# Patient Record
Sex: MALE | Race: WHITE | Employment: UNEMPLOYED | URBAN - METROPOLITAN AREA
[De-identification: names, ages, dates, MRNs, and addresses within clinical notes are randomized per-mention and may not be internally consistent; named-entity substitution may affect disease eponyms.]

---

## 2019-02-15 ENCOUNTER — OFFICE VISIT (OUTPATIENT)
Dept: FAMILY MEDICINE CLINIC | Age: 24
End: 2019-02-15
Payer: COMMERCIAL

## 2019-02-15 VITALS
WEIGHT: 114 LBS | SYSTOLIC BLOOD PRESSURE: 98 MMHG | DIASTOLIC BLOOD PRESSURE: 70 MMHG | BODY MASS INDEX: 18.99 KG/M2 | HEIGHT: 65 IN

## 2019-02-15 DIAGNOSIS — F84.0 AUTISM SPECTRUM DISORDER WITH ACCOMPANYING INTELLECTUAL IMPAIRMENT, REQUIRING SUBTANTIAL SUPPORT (LEVEL 2): ICD-10-CM

## 2019-02-15 DIAGNOSIS — Z23 NEED FOR TDAP VACCINATION: ICD-10-CM

## 2019-02-15 DIAGNOSIS — Q90.9 DOWN'S SYNDROME: ICD-10-CM

## 2019-02-15 DIAGNOSIS — Z00.00 PREVENTATIVE HEALTH CARE: Primary | ICD-10-CM

## 2019-02-15 PROCEDURE — 90715 TDAP VACCINE 7 YRS/> IM: CPT | Performed by: FAMILY MEDICINE

## 2019-02-15 PROCEDURE — 90471 IMMUNIZATION ADMIN: CPT | Performed by: FAMILY MEDICINE

## 2019-02-15 PROCEDURE — 99385 PREV VISIT NEW AGE 18-39: CPT | Performed by: FAMILY MEDICINE

## 2019-02-15 RX ORDER — HYDROXYZINE HYDROCHLORIDE 25 MG/1
25 TABLET, FILM COATED ORAL 3 TIMES DAILY PRN
COMMUNITY
End: 2019-12-09

## 2019-02-15 ASSESSMENT — PATIENT HEALTH QUESTIONNAIRE - PHQ9
DEPRESSION UNABLE TO ASSESS: FUNCTIONAL CAPACITY MOTIVATION LIMITS ACCURACY
SUM OF ALL RESPONSES TO PHQ QUESTIONS 1-9: 0
SUM OF ALL RESPONSES TO PHQ QUESTIONS 1-9: 0
2. FEELING DOWN, DEPRESSED OR HOPELESS: 0
1. LITTLE INTEREST OR PLEASURE IN DOING THINGS: 0
SUM OF ALL RESPONSES TO PHQ9 QUESTIONS 1 & 2: 0

## 2019-02-25 ASSESSMENT — ENCOUNTER SYMPTOMS
SHORTNESS OF BREATH: 0
ABDOMINAL PAIN: 0
BLOOD IN STOOL: 0
ROS SKIN COMMENTS: NO SKIN WOUNDS.

## 2019-03-01 ENCOUNTER — HOSPITAL ENCOUNTER (OUTPATIENT)
Dept: GENERAL RADIOLOGY | Age: 24
Discharge: HOME | End: 2019-03-01
Payer: COMMERCIAL

## 2019-03-01 ENCOUNTER — OFFICE VISIT (OUTPATIENT)
Dept: FAMILY MEDICINE CLINIC | Age: 24
End: 2019-03-01
Payer: COMMERCIAL

## 2019-03-01 ENCOUNTER — HOSPITAL ENCOUNTER (OUTPATIENT)
Age: 24
Discharge: HOME | End: 2019-03-01
Payer: COMMERCIAL

## 2019-03-01 VITALS
TEMPERATURE: 97.2 F | DIASTOLIC BLOOD PRESSURE: 74 MMHG | WEIGHT: 112.2 LBS | SYSTOLIC BLOOD PRESSURE: 112 MMHG | HEIGHT: 65 IN | BODY MASS INDEX: 18.69 KG/M2

## 2019-03-01 DIAGNOSIS — R05.9 COUGH: ICD-10-CM

## 2019-03-01 DIAGNOSIS — R50.9 FEVER, UNSPECIFIED FEVER CAUSE: Primary | ICD-10-CM

## 2019-03-01 DIAGNOSIS — R50.9 FEVER, UNSPECIFIED FEVER CAUSE: ICD-10-CM

## 2019-03-01 LAB
INFLUENZA A ANTIGEN, POC: NEGATIVE
INFLUENZA B ANTIGEN, POC: NEGATIVE

## 2019-03-01 PROCEDURE — 99213 OFFICE O/P EST LOW 20 MIN: CPT | Performed by: FAMILY MEDICINE

## 2019-03-01 PROCEDURE — 71046 X-RAY EXAM CHEST 2 VIEWS: CPT

## 2019-03-01 PROCEDURE — 87804 INFLUENZA ASSAY W/OPTIC: CPT | Performed by: FAMILY MEDICINE

## 2019-07-26 ENCOUNTER — OFFICE VISIT (OUTPATIENT)
Dept: FAMILY MEDICINE CLINIC | Age: 24
End: 2019-07-26
Payer: COMMERCIAL

## 2019-07-26 VITALS
BODY MASS INDEX: 18.99 KG/M2 | SYSTOLIC BLOOD PRESSURE: 118 MMHG | HEIGHT: 65 IN | WEIGHT: 114 LBS | DIASTOLIC BLOOD PRESSURE: 80 MMHG

## 2019-07-26 DIAGNOSIS — Q90.9 DOWN'S SYNDROME: ICD-10-CM

## 2019-07-26 DIAGNOSIS — H92.13 OTORRHEA OF BOTH EARS: Primary | ICD-10-CM

## 2019-07-26 PROCEDURE — 99213 OFFICE O/P EST LOW 20 MIN: CPT | Performed by: FAMILY MEDICINE

## 2019-07-26 RX ORDER — CIPROFLOXACIN HYDROCHLORIDE 3.5 MG/ML
SOLUTION/ DROPS TOPICAL
COMMUNITY
Start: 2019-07-20 | End: 2019-09-05 | Stop reason: ALTCHOICE

## 2019-07-26 RX ORDER — AMOXICILLIN 400 MG/5ML
POWDER, FOR SUSPENSION ORAL
COMMUNITY
Start: 2019-07-20 | End: 2019-07-26 | Stop reason: ALTCHOICE

## 2019-07-26 RX ORDER — AZITHROMYCIN 200 MG/5ML
POWDER, FOR SUSPENSION ORAL
Qty: 36 ML | Refills: 0
Start: 2019-07-26 | End: 2019-09-05 | Stop reason: ALTCHOICE

## 2019-09-05 ENCOUNTER — OFFICE VISIT (OUTPATIENT)
Dept: FAMILY MEDICINE CLINIC | Age: 24
End: 2019-09-05
Payer: COMMERCIAL

## 2019-09-05 VITALS
WEIGHT: 109.4 LBS | HEIGHT: 65 IN | BODY MASS INDEX: 18.23 KG/M2 | DIASTOLIC BLOOD PRESSURE: 60 MMHG | SYSTOLIC BLOOD PRESSURE: 108 MMHG | TEMPERATURE: 97.4 F

## 2019-09-05 DIAGNOSIS — R23.1 CLAMMY SKIN: ICD-10-CM

## 2019-09-05 DIAGNOSIS — J02.9 SORE THROAT: ICD-10-CM

## 2019-09-05 DIAGNOSIS — R50.9 FEVER, UNSPECIFIED FEVER CAUSE: Primary | ICD-10-CM

## 2019-09-05 DIAGNOSIS — F84.0 AUTISM SPECTRUM DISORDER WITH ACCOMPANYING INTELLECTUAL IMPAIRMENT, REQUIRING SUBTANTIAL SUPPORT (LEVEL 2): ICD-10-CM

## 2019-09-05 DIAGNOSIS — Q90.9 DOWN'S SYNDROME: ICD-10-CM

## 2019-09-05 PROCEDURE — 99213 OFFICE O/P EST LOW 20 MIN: CPT | Performed by: FAMILY MEDICINE

## 2019-09-05 RX ORDER — AZITHROMYCIN 200 MG/5ML
POWDER, FOR SUSPENSION ORAL
Qty: 15 ML | Refills: 0 | Status: SHIPPED | OUTPATIENT
Start: 2019-09-05 | End: 2019-12-09

## 2019-09-05 ASSESSMENT — ENCOUNTER SYMPTOMS
ALLERGIC/IMMUNOLOGIC NEGATIVE: 1
EYES NEGATIVE: 1
TROUBLE SWALLOWING: 1
CHOKING: 1
GASTROINTESTINAL NEGATIVE: 1
SORE THROAT: 1
VOICE CHANGE: 1

## 2019-12-09 ENCOUNTER — OFFICE VISIT (OUTPATIENT)
Dept: FAMILY MEDICINE CLINIC | Age: 24
End: 2019-12-09
Payer: COMMERCIAL

## 2019-12-09 ENCOUNTER — TELEPHONE (OUTPATIENT)
Dept: FAMILY MEDICINE CLINIC | Age: 24
End: 2019-12-09

## 2019-12-09 VITALS
DIASTOLIC BLOOD PRESSURE: 76 MMHG | HEIGHT: 65 IN | BODY MASS INDEX: 19.16 KG/M2 | SYSTOLIC BLOOD PRESSURE: 114 MMHG | WEIGHT: 115 LBS | TEMPERATURE: 97.9 F

## 2019-12-09 DIAGNOSIS — B96.89 ACUTE BACTERIAL SINUSITIS: Primary | ICD-10-CM

## 2019-12-09 DIAGNOSIS — J01.90 ACUTE BACTERIAL SINUSITIS: Primary | ICD-10-CM

## 2019-12-09 DIAGNOSIS — Z00.00 PREVENTATIVE HEALTH CARE: Primary | ICD-10-CM

## 2019-12-09 PROCEDURE — 99213 OFFICE O/P EST LOW 20 MIN: CPT | Performed by: INTERNAL MEDICINE

## 2019-12-09 RX ORDER — AZITHROMYCIN 250 MG/1
250 TABLET, FILM COATED ORAL SEE ADMIN INSTRUCTIONS
Qty: 6 TABLET | Refills: 0 | Status: SHIPPED | OUTPATIENT
Start: 2019-12-09 | End: 2019-12-14

## 2019-12-09 RX ORDER — AZITHROMYCIN 250 MG/1
250 TABLET, FILM COATED ORAL SEE ADMIN INSTRUCTIONS
Qty: 6 TABLET | Refills: 0 | Status: SHIPPED | OUTPATIENT
Start: 2019-12-09 | End: 2019-12-09 | Stop reason: SDUPTHER

## 2019-12-09 ASSESSMENT — ENCOUNTER SYMPTOMS
ABDOMINAL PAIN: 0
SHORTNESS OF BREATH: 0
SINUS PRESSURE: 1
APNEA: 0
COUGH: 0
RHINORRHEA: 1

## 2019-12-17 DIAGNOSIS — Z00.00 PREVENTATIVE HEALTH CARE: ICD-10-CM

## 2019-12-17 LAB
A/G RATIO: 1.4 (ref 1.1–2.2)
ALBUMIN SERPL-MCNC: 4.4 G/DL (ref 3.4–5)
ALP BLD-CCNC: 111 U/L (ref 40–129)
ALT SERPL-CCNC: 9 U/L (ref 10–40)
ANION GAP SERPL CALCULATED.3IONS-SCNC: 19 MMOL/L (ref 3–16)
AST SERPL-CCNC: 23 U/L (ref 15–37)
BASOPHILS ABSOLUTE: 0.1 K/UL (ref 0–0.2)
BASOPHILS RELATIVE PERCENT: 1.3 %
BILIRUB SERPL-MCNC: 2 MG/DL (ref 0–1)
BUN BLDV-MCNC: 10 MG/DL (ref 7–20)
CALCIUM SERPL-MCNC: 9.5 MG/DL (ref 8.3–10.6)
CHLORIDE BLD-SCNC: 97 MMOL/L (ref 99–110)
CHOLESTEROL, TOTAL: 114 MG/DL (ref 0–199)
CO2: 24 MMOL/L (ref 21–32)
CREAT SERPL-MCNC: 0.8 MG/DL (ref 0.9–1.3)
EOSINOPHILS ABSOLUTE: 0.1 K/UL (ref 0–0.6)
EOSINOPHILS RELATIVE PERCENT: 3.2 %
GFR AFRICAN AMERICAN: >60
GFR NON-AFRICAN AMERICAN: >60
GLOBULIN: 3.2 G/DL
GLUCOSE BLD-MCNC: 89 MG/DL (ref 70–99)
HCT VFR BLD CALC: 50.6 % (ref 40.5–52.5)
HDLC SERPL-MCNC: 28 MG/DL (ref 40–60)
HEMOGLOBIN: 16.7 G/DL (ref 13.5–17.5)
LDL CHOLESTEROL CALCULATED: 70 MG/DL
LYMPHOCYTES ABSOLUTE: 1 K/UL (ref 1–5.1)
LYMPHOCYTES RELATIVE PERCENT: 21.7 %
MCH RBC QN AUTO: 34.5 PG (ref 26–34)
MCHC RBC AUTO-ENTMCNC: 33 G/DL (ref 31–36)
MCV RBC AUTO: 104.4 FL (ref 80–100)
MONOCYTES ABSOLUTE: 0.8 K/UL (ref 0–1.3)
MONOCYTES RELATIVE PERCENT: 17.4 %
NEUTROPHILS ABSOLUTE: 2.6 K/UL (ref 1.7–7.7)
NEUTROPHILS RELATIVE PERCENT: 56.4 %
PDW BLD-RTO: 13.7 % (ref 12.4–15.4)
PLATELET # BLD: 190 K/UL (ref 135–450)
PMV BLD AUTO: 10.4 FL (ref 5–10.5)
POTASSIUM SERPL-SCNC: 4.3 MMOL/L (ref 3.5–5.1)
RBC # BLD: 4.85 M/UL (ref 4.2–5.9)
SODIUM BLD-SCNC: 140 MMOL/L (ref 136–145)
T4 FREE: 1.1 NG/DL (ref 0.9–1.8)
TOTAL PROTEIN: 7.6 G/DL (ref 6.4–8.2)
TRIGL SERPL-MCNC: 78 MG/DL (ref 0–150)
TSH REFLEX: 5.46 UIU/ML (ref 0.27–4.2)
VITAMIN D 25-HYDROXY: 29.6 NG/ML
VLDLC SERPL CALC-MCNC: 16 MG/DL
WBC # BLD: 4.6 K/UL (ref 4–11)

## 2019-12-19 ENCOUNTER — OFFICE VISIT (OUTPATIENT)
Dept: FAMILY MEDICINE CLINIC | Age: 24
End: 2019-12-19
Payer: COMMERCIAL

## 2019-12-19 VITALS
HEIGHT: 64 IN | BODY MASS INDEX: 19.53 KG/M2 | SYSTOLIC BLOOD PRESSURE: 110 MMHG | WEIGHT: 114.4 LBS | DIASTOLIC BLOOD PRESSURE: 70 MMHG

## 2019-12-19 PROCEDURE — 90471 IMMUNIZATION ADMIN: CPT | Performed by: FAMILY MEDICINE

## 2019-12-19 PROCEDURE — 99395 PREV VISIT EST AGE 18-39: CPT | Performed by: FAMILY MEDICINE

## 2019-12-19 PROCEDURE — 90686 IIV4 VACC NO PRSV 0.5 ML IM: CPT | Performed by: FAMILY MEDICINE

## 2019-12-19 RX ORDER — TRIAMCINOLONE ACETONIDE 1 MG/G
CREAM TOPICAL
Qty: 80 G | Refills: 0 | Status: SHIPPED | OUTPATIENT
Start: 2019-12-19 | End: 2020-03-16 | Stop reason: ALTCHOICE

## 2019-12-19 NOTE — PROGRESS NOTES
Vaccine Information Sheet, \"Influenza - Inactivated\"  given to Juanjose Meyers, or parent/legal guardian of  Juanjose Meyers and verbalized understanding. Patient responses:    Have you ever had a reaction to a flu vaccine? No  Do you have any current illness? No  Have you ever had Guillian Slidell Syndrome? No  Do you have a serious allergy to any of the follow: Neomycin, Polymyxin, Thimerosal, eggs or egg products? No    Flu vaccine given per order. Please see immunization tab. Risks and benefits explained. Current VIS given.       Immunizations Administered     Name Date Dose Route    Influenza, Quadv, IM, PF (6 mo and older Fluzone, Flulaval, Fluarix, and 3 yrs and older Afluria) 12/19/2019 0.5 mL Intramuscular    Site: Deltoid- Left    Lot: [id number]    [address]: 80870-440-14

## 2019-12-19 NOTE — PROGRESS NOTES
12/19/2019    Cipriano Jo is a 27 y.o. male    Chief Complaint   Patient presents with    Annual Exam     cpe        SUBJECTIVE  HPI Cipriano Jo is a 27 y.o. male presenting today with his mother,with a chief complaint of needing a yearly physical exam.  Fasting labs done December 17, 2019. Mom would like me to look at a rash in the eyebrows. Fasting labs done. Review of Systems   Constitutional: Negative for chills and fever. HENT: Negative for ear pain, mouth sores and nosebleeds. Eyes: Negative for pain. Respiratory: Negative for shortness of breath and wheezing. Cardiovascular: Negative for chest pain and leg swelling. Gastrointestinal: Positive for abdominal pain. Negative for blood in stool, constipation, diarrhea and vomiting. Genitourinary: Negative for hematuria. Musculoskeletal: Negative for joint swelling. Skin: Negative for rash. Neurological: Negative for tremors and syncope. Hematological: Negative for adenopathy. Does not bruise/bleed easily. Past Medical History:   Diagnosis Date    Autism     Autism     Celiac disease     Down syndrome     Down syndrome     Heart murmur      Past Surgical History:   Procedure Laterality Date    CARDIAC SURGERY      as a baby    CARDIAC SURGERY      ESOPHAGEAL DILATATION      TYMPANOSTOMY TUBE PLACEMENT       Family History   Problem Relation Age of Onset    Diabetes Paternal Grandmother     Cancer Paternal Grandmother         Breast and Kidney    High Blood Pressure Paternal Grandmother     High Blood Pressure Paternal Grandfather      Social History     Tobacco Use   Smoking Status Never Smoker   Smokeless Tobacco Never Used      Patient Active Problem List   Diagnosis    Autism spectrum disorder with accompanying intellectual impairment, requiring subtantial support (level 2)    Down's syndrome       OBJECTIVE  Physical Exam  Constitutional:       General: He is not in acute distress.      Appearance: (Prevnar7) 06/14/2000    Polio IPV (IPOL) 1997, 1997, 12/18/1998, 07/16/2001    Tdap (Boostrix, Adacel) 07/14/2008, 06/22/2009, 02/15/2019    Varicella (Varivax) 08/13/1998, 07/14/2008     Lab Review   Orders Only on 12/17/2019   Component Date Value    Sodium 12/17/2019 140     Potassium 12/17/2019 4.3     Chloride 12/17/2019 97*    CO2 12/17/2019 24     Anion Gap 12/17/2019 19*    Glucose 12/17/2019 89     BUN 12/17/2019 10     CREATININE 12/17/2019 0.8*    GFR Non- 12/17/2019 >60     GFR  12/17/2019 >60     Calcium 12/17/2019 9.5     Total Protein 12/17/2019 7.6     Alb 12/17/2019 4.4     Albumin/Globulin Ratio 12/17/2019 1.4     Total Bilirubin 12/17/2019 2.0*    Alkaline Phosphatase 12/17/2019 111     ALT 12/17/2019 9*    AST 12/17/2019 23     Globulin 12/17/2019 3.2     TSH 12/17/2019 5.46*    Vit D, 25-Hydroxy 12/17/2019 29.6*    Cholesterol, Total 12/17/2019 114     Triglycerides 12/17/2019 78     HDL 12/17/2019 28*    LDL Calculated 12/17/2019 70     VLDL Cholesterol Calcula* 12/17/2019 16     WBC 12/17/2019 4.6     RBC 12/17/2019 4.85     Hemoglobin 12/17/2019 16.7     Hematocrit 12/17/2019 50.6     MCV 12/17/2019 104.4*    MCH 12/17/2019 34.5*    MCHC 12/17/2019 33.0     RDW 12/17/2019 13.7     Platelets 18/35/1321 190     MPV 12/17/2019 10.4     Neutrophils % 12/17/2019 56.4     Lymphocytes % 12/17/2019 21.7     Monocytes % 12/17/2019 17.4     Eosinophils % 12/17/2019 3.2     Basophils % 12/17/2019 1.3     Neutrophils Absolute 12/17/2019 2.6     Lymphocytes Absolute 12/17/2019 1.0     Monocytes Absolute 12/17/2019 0.8     Eosinophils Absolute 12/17/2019 0.1     Basophils Absolute 12/17/2019 0.1     T4 Free 12/17/2019 1.1        ASSESSMENT AND PLAN     Diagnosis Orders   1. Preventative health care     2. Elevated MCV  US GALLBLADDER RUQ   3. Elevated bilirubin  US GALLBLADDER RUQ   4.  Need for influenza vaccination  INFLUENZA, QUADV, 3 YRS AND OLDER, IM PF, PREFILL SYR OR SDV, 0.5ML (AFLURIA QUADV, PF)   5. Down's syndrome     6. Autism spectrum disorder with accompanying intellectual impairment, requiring subtantial support (level 2)       Liver us ordered  Recheck labs sometime in the next week -- will hold on b12 (super b) and vit d until I get us and lab results. We discussed supplementing b complex  New meds this visit:    Orders Placed This Encounter   Medications    triamcinolone (KENALOG) 0.1 % cream     Sig: Apply topically 2 times daily if needed eyebrow rash. Dispense:  80 g     Refill:  0     New orders placed this visit:    Orders Placed This Encounter   Procedures    US GALLBLADDER RUQ     Standing Status:   Future     Number of Occurrences:   1     Standing Expiration Date:   12/19/2020    INFLUENZA, QUADV, 3 YRS AND OLDER, IM PF, PREFILL SYR OR SDV, 0.5ML (AFLURIA QUADV, PF)     No follow-ups on file. continuewith the following meds:    Outpatient Encounter Medications as of 12/19/2019   Medication Sig Dispense Refill    triamcinolone (KENALOG) 0.1 % cream Apply topically 2 times daily if needed eyebrow rash. 80 g 0    omeprazole (PRILOSEC) 40 MG delayed release capsule Take 40 mg by mouth daily 20ml daily ORAL SUSPENSION       No facility-administered encounter medications on file as of 12/19/2019.           Danny Taveras D.O.

## 2019-12-19 NOTE — PATIENT INSTRUCTIONS
Please call your pharmacy if you need any refills of your medication(s). Please call our office at [phone] if you don't hear from us about your test results. Please be sure to call our office if your illness/problem has been treated for but has not completely resolved. Bring an accurate list of your medications with you at every appointment to ensure that we have the correct information. Our office hours are: Monday - Friday 7 am- 5 pm; Saturdays vary on doctor working.     Phone lines turn on at 8 am.

## 2019-12-28 ENCOUNTER — HOSPITAL ENCOUNTER (OUTPATIENT)
Dept: ULTRASOUND IMAGING | Age: 24
Discharge: HOME | End: 2019-12-28
Payer: COMMERCIAL

## 2019-12-28 DIAGNOSIS — R71.8 ELEVATED MCV: ICD-10-CM

## 2019-12-28 DIAGNOSIS — R17 ELEVATED BILIRUBIN: ICD-10-CM

## 2019-12-28 PROCEDURE — 76705 ECHO EXAM OF ABDOMEN: CPT

## 2020-01-04 DIAGNOSIS — R89.9 ABNORMAL LABORATORY TEST RESULT: ICD-10-CM

## 2020-01-04 DIAGNOSIS — Z00.00 PREVENTATIVE HEALTH CARE: ICD-10-CM

## 2020-01-04 LAB
A/G RATIO: 1.7 (ref 1.1–2.2)
ALBUMIN SERPL-MCNC: 4.4 G/DL (ref 3.4–5)
ALP BLD-CCNC: 106 U/L (ref 40–129)
ALT SERPL-CCNC: 12 U/L (ref 10–40)
ANION GAP SERPL CALCULATED.3IONS-SCNC: 14 MMOL/L (ref 3–16)
AST SERPL-CCNC: 15 U/L (ref 15–37)
BILIRUB SERPL-MCNC: 1.7 MG/DL (ref 0–1)
BILIRUBIN DIRECT: 0.3 MG/DL (ref 0–0.3)
BILIRUBIN, INDIRECT: 1.4 MG/DL (ref 0–1)
BUN BLDV-MCNC: 11 MG/DL (ref 7–20)
CALCIUM SERPL-MCNC: 9.7 MG/DL (ref 8.3–10.6)
CHLORIDE BLD-SCNC: 99 MMOL/L (ref 99–110)
CHOLESTEROL, TOTAL: 110 MG/DL (ref 0–199)
CO2: 28 MMOL/L (ref 21–32)
CREAT SERPL-MCNC: 0.9 MG/DL (ref 0.9–1.3)
GFR AFRICAN AMERICAN: >60
GFR NON-AFRICAN AMERICAN: >60
GLOBULIN: 2.6 G/DL
GLUCOSE BLD-MCNC: 95 MG/DL (ref 70–99)
HDLC SERPL-MCNC: 27 MG/DL (ref 40–60)
LDL CHOLESTEROL CALCULATED: 64 MG/DL
POTASSIUM SERPL-SCNC: 4.9 MMOL/L (ref 3.5–5.1)
SODIUM BLD-SCNC: 141 MMOL/L (ref 136–145)
TOTAL PROTEIN: 7 G/DL (ref 6.4–8.2)
TRIGL SERPL-MCNC: 95 MG/DL (ref 0–150)
VLDLC SERPL CALC-MCNC: 19 MG/DL

## 2020-01-09 RX ORDER — ERGOCALCIFEROL 1.25 MG/1
50000 CAPSULE ORAL WEEKLY
Qty: 12 CAPSULE | Refills: 1 | Status: SHIPPED | OUTPATIENT
Start: 2020-01-09 | End: 2022-06-27 | Stop reason: ALTCHOICE

## 2020-01-09 RX ORDER — LEVOTHYROXINE SODIUM 0.03 MG/1
25 TABLET ORAL DAILY
Qty: 90 TABLET | Refills: 1 | Status: SHIPPED | OUTPATIENT
Start: 2020-01-09 | End: 2020-03-16 | Stop reason: DRUGHIGH

## 2020-01-09 ASSESSMENT — ENCOUNTER SYMPTOMS
WHEEZING: 0
CONSTIPATION: 0
SHORTNESS OF BREATH: 0
VOMITING: 0
ABDOMINAL PAIN: 1
EYE PAIN: 0
BLOOD IN STOOL: 0
DIARRHEA: 0

## 2020-01-14 ENCOUNTER — OFFICE VISIT (OUTPATIENT)
Dept: FAMILY MEDICINE CLINIC | Age: 25
End: 2020-01-14
Payer: COMMERCIAL

## 2020-01-14 ENCOUNTER — TELEPHONE (OUTPATIENT)
Dept: FAMILY MEDICINE CLINIC | Age: 25
End: 2020-01-14

## 2020-01-14 VITALS
WEIGHT: 114 LBS | SYSTOLIC BLOOD PRESSURE: 110 MMHG | BODY MASS INDEX: 19.46 KG/M2 | HEIGHT: 64 IN | DIASTOLIC BLOOD PRESSURE: 70 MMHG

## 2020-01-14 PROCEDURE — 99213 OFFICE O/P EST LOW 20 MIN: CPT | Performed by: FAMILY MEDICINE

## 2020-01-14 NOTE — TELEPHONE ENCOUNTER
[id number], pt had 1/4/20 labs done where cmp and flp were not ordered (just the liver panel)  Please make sure theat pt is not charged.

## 2020-01-16 ENCOUNTER — TELEPHONE (OUTPATIENT)
Dept: FAMILY MEDICINE CLINIC | Age: 25
End: 2020-01-16

## 2020-01-16 NOTE — TELEPHONE ENCOUNTER
Have contacted the lab and after emails back and forth to my questions they are forwarding on to see if it was a phlebotomist error.

## 2020-01-27 NOTE — TELEPHONE ENCOUNTER
Check media for email from supervisor that said the orders were released too soon and should not have been drawn or released. They will be credited if patient billed.

## 2020-01-28 ASSESSMENT — ENCOUNTER SYMPTOMS: SHORTNESS OF BREATH: 0

## 2020-03-11 DIAGNOSIS — Z51.81 MEDICATION MONITORING ENCOUNTER: ICD-10-CM

## 2020-03-11 DIAGNOSIS — D75.89 MACROCYTOSIS: ICD-10-CM

## 2020-03-11 DIAGNOSIS — E03.9 HYPOTHYROIDISM (ACQUIRED): ICD-10-CM

## 2020-03-11 DIAGNOSIS — R79.89 LOW VITAMIN D LEVEL: ICD-10-CM

## 2020-03-11 DIAGNOSIS — R17 ELEVATED BILIRUBIN: ICD-10-CM

## 2020-03-11 LAB
ALBUMIN SERPL-MCNC: 4.2 G/DL (ref 3.4–5)
ALP BLD-CCNC: 110 U/L (ref 40–129)
ALT SERPL-CCNC: 11 U/L (ref 10–40)
AST SERPL-CCNC: 21 U/L (ref 15–37)
BASOPHILS ABSOLUTE: 0.1 K/UL (ref 0–0.2)
BASOPHILS RELATIVE PERCENT: 1.2 %
BILIRUB SERPL-MCNC: 2.4 MG/DL (ref 0–1)
BILIRUBIN DIRECT: <0.2 MG/DL (ref 0–0.3)
BILIRUBIN, INDIRECT: ABNORMAL MG/DL (ref 0–1)
EOSINOPHILS ABSOLUTE: 0.1 K/UL (ref 0–0.6)
EOSINOPHILS RELATIVE PERCENT: 2.4 %
FOLATE: 5.13 NG/ML (ref 4.78–24.2)
HCT VFR BLD CALC: 48.9 % (ref 40.5–52.5)
HEMOGLOBIN: 16.4 G/DL (ref 13.5–17.5)
LYMPHOCYTES ABSOLUTE: 0.7 K/UL (ref 1–5.1)
LYMPHOCYTES RELATIVE PERCENT: 18.4 %
MAGNESIUM: 2.1 MG/DL (ref 1.8–2.4)
MCH RBC QN AUTO: 34.1 PG (ref 26–34)
MCHC RBC AUTO-ENTMCNC: 33.5 G/DL (ref 31–36)
MCV RBC AUTO: 101.9 FL (ref 80–100)
MONOCYTES ABSOLUTE: 0.6 K/UL (ref 0–1.3)
MONOCYTES RELATIVE PERCENT: 14.3 %
NEUTROPHILS ABSOLUTE: 2.6 K/UL (ref 1.7–7.7)
NEUTROPHILS RELATIVE PERCENT: 63.7 %
PDW BLD-RTO: 13.7 % (ref 12.4–15.4)
PLATELET # BLD: 172 K/UL (ref 135–450)
PMV BLD AUTO: 10.2 FL (ref 5–10.5)
RBC # BLD: 4.8 M/UL (ref 4.2–5.9)
T4 FREE: 1.1 NG/DL (ref 0.9–1.8)
TOTAL PROTEIN: 7.5 G/DL (ref 6.4–8.2)
TSH REFLEX: 8.27 UIU/ML (ref 0.27–4.2)
VITAMIN B-12: 231 PG/ML (ref 211–911)
VITAMIN D 25-HYDROXY: 22.3 NG/ML
WBC # BLD: 4 K/UL (ref 4–11)

## 2020-03-16 ENCOUNTER — OFFICE VISIT (OUTPATIENT)
Dept: FAMILY MEDICINE CLINIC | Age: 25
End: 2020-03-16
Payer: COMMERCIAL

## 2020-03-16 VITALS
HEART RATE: 78 BPM | WEIGHT: 114 LBS | DIASTOLIC BLOOD PRESSURE: 68 MMHG | SYSTOLIC BLOOD PRESSURE: 108 MMHG | BODY MASS INDEX: 19.46 KG/M2 | TEMPERATURE: 98.4 F | HEIGHT: 64 IN | OXYGEN SATURATION: 96 %

## 2020-03-16 PROCEDURE — 99203 OFFICE O/P NEW LOW 30 MIN: CPT | Performed by: INTERNAL MEDICINE

## 2020-03-16 RX ORDER — CETIRIZINE HYDROCHLORIDE 10 MG/1
TABLET, CHEWABLE ORAL
COMMUNITY

## 2020-03-16 RX ORDER — MELATONIN
1000 DAILY
Qty: 30 TABLET | Refills: 5 | Status: SHIPPED | OUTPATIENT
Start: 2020-03-16 | End: 2022-06-27 | Stop reason: ALTCHOICE

## 2020-03-16 RX ORDER — LEVOTHYROXINE SODIUM 13 UG/ML
SOLUTION ORAL
Qty: 30 ML | Refills: 2 | Status: SHIPPED | OUTPATIENT
Start: 2020-03-16 | End: 2020-05-20

## 2020-03-16 SDOH — HEALTH STABILITY: MENTAL HEALTH: HOW OFTEN DO YOU HAVE A DRINK CONTAINING ALCOHOL?: NEVER

## 2020-03-16 ASSESSMENT — ENCOUNTER SYMPTOMS
NAUSEA: 0
DIARRHEA: 0
BLOOD IN STOOL: 0
BACK PAIN: 0
VOMITING: 0
RHINORRHEA: 0
ABDOMINAL PAIN: 1
COUGH: 0

## 2020-03-16 ASSESSMENT — PATIENT HEALTH QUESTIONNAIRE - PHQ9
SUM OF ALL RESPONSES TO PHQ QUESTIONS 1-9: 0
2. FEELING DOWN, DEPRESSED OR HOPELESS: 0
1. LITTLE INTEREST OR PLEASURE IN DOING THINGS: 0
SUM OF ALL RESPONSES TO PHQ QUESTIONS 1-9: 0
SUM OF ALL RESPONSES TO PHQ9 QUESTIONS 1 & 2: 0

## 2020-03-16 NOTE — PROGRESS NOTES
need to see your doctor regularly to have blood tests and to make sure you are doing well. Follow-up care is a key part of your treatment and safety. Be sure to make and go to all appointments, and call your doctor if you are having problems. It's also a good idea to know your test results and keep a list of the medicines you take. How can you care for yourself at home? · Take your thyroid hormone medicine exactly as prescribed. Call your doctor if you think you are having a problem with your medicine. Most people do not have side effects if they take the right amount of medicine regularly. ? Take the medicine 30 minutes before breakfast, and do not take it with calcium, vitamins, or iron. ? Do not take extra doses of your thyroid medicine. It will not help you get better any faster, and it may cause side effects. ? If you forget to take a dose, do NOT take a double dose of medicine. Take your usual dose the next day. · Tell your doctor about all prescription, herbal, or over-the-counter products you take. · Take care of yourself. Eat a healthy diet, get enough sleep, and get regular exercise. When should you call for help? Call 911 anytime you think you may need emergency care. For example, call if:    · You passed out (lost consciousness).     · You have severe trouble breathing.     · You have a very slow heartbeat (less than 60 beats a minute).     · You have a low body temperature (95°F or below).    Call your doctor now or seek immediate medical care if:    · You feel tired, sluggish, or weak.     · You have trouble remembering things or concentrating.     · You do not begin to feel better 2 weeks after starting your medicine.    Watch closely for changes in your health, and be sure to contact your doctor if you have any problems. Where can you learn more? Go to https://chsagar.[employer]. org and sign in to your [employer] account.  Enter O250 in the Rad[employer] box to learn more about \"Hypothyroidism: Care Instructions. \"     If you do not have an account, please click on the \"Sign Up Now\" link. Current as of: July 28, 2019Content Version: 12.4  © 9718-1524 Healthwise, Incorporated. Care instructions adapted under license by [facility] ([facility]). If you have questions about a medical condition or this instruction, always ask your healthcare professional. [address] any warranty or liability for your use of this information.

## 2020-05-20 RX ORDER — LEVOTHYROXINE SODIUM 13 UG/ML
SOLUTION ORAL
Qty: 30 ML | Refills: 2 | Status: SHIPPED | OUTPATIENT
Start: 2020-05-20 | End: 2020-05-22 | Stop reason: SDUPTHER

## 2020-05-22 RX ORDER — LEVOTHYROXINE SODIUM 13 UG/ML
SOLUTION ORAL
Qty: 30 ML | Refills: 2 | Status: SHIPPED | OUTPATIENT
Start: 2020-05-22 | End: 2020-07-20

## 2020-07-20 RX ORDER — LEVOTHYROXINE SODIUM 13 UG/ML
SOLUTION ORAL
Qty: 30 ML | Refills: 2 | Status: SHIPPED | OUTPATIENT
Start: 2020-07-20 | End: 2020-08-20

## 2020-08-04 ENCOUNTER — TELEPHONE (OUTPATIENT)
Dept: FAMILY MEDICINE CLINIC | Age: 25
End: 2020-08-04

## 2020-08-04 DIAGNOSIS — E55.9 VITAMIN D DEFICIENCY: ICD-10-CM

## 2020-08-04 DIAGNOSIS — E53.8 VITAMIN B 12 DEFICIENCY: ICD-10-CM

## 2020-08-04 DIAGNOSIS — E03.9 ACQUIRED HYPOTHYROIDISM: ICD-10-CM

## 2020-08-04 NOTE — TELEPHONE ENCOUNTER
Pt mom Collin Hill calling to see if you will add on a vitamin b-12 and a vitamin D checked as well.       Please advise  [phone]

## 2020-08-05 LAB
TSH SERPL DL<=0.05 MIU/L-ACNC: 2.7 UIU/ML (ref 0.27–4.2)
VITAMIN B-12: 333 PG/ML (ref 211–911)
VITAMIN D 25-HYDROXY: 35.5 NG/ML

## 2020-08-06 ENCOUNTER — TELEPHONE (OUTPATIENT)
Dept: FAMILY MEDICINE CLINIC | Age: 25
End: 2020-08-06

## 2020-08-06 NOTE — TELEPHONE ENCOUNTER
He does not make a difference because the reflux would have only been done if it was abnormal and it was normal, I guess I think TSH is her more specific than T4

## 2020-08-06 NOTE — TELEPHONE ENCOUNTER
Noe Boyce patient mom (oh hipaa) calling to see why TSH was without reflex this time.       Please advise,  [phone]

## 2020-08-20 RX ORDER — LEVOTHYROXINE SODIUM 13 UG/ML
SOLUTION ORAL
Qty: 30 ML | Refills: 2 | Status: SHIPPED | OUTPATIENT
Start: 2020-08-20 | End: 2020-08-21 | Stop reason: SDUPTHER

## 2020-08-21 RX ORDER — LEVOTHYROXINE SODIUM 13 UG/ML
SOLUTION ORAL
Qty: 30 ML | Refills: 2 | Status: SHIPPED | OUTPATIENT
Start: 2020-08-21 | End: 2020-10-26

## 2020-10-26 RX ORDER — LEVOTHYROXINE SODIUM 13 UG/ML
SOLUTION ORAL
Qty: 30 ML | Refills: 2 | Status: SHIPPED | OUTPATIENT
Start: 2020-10-26 | End: 2021-02-05

## 2021-02-05 DIAGNOSIS — E03.9 ACQUIRED HYPOTHYROIDISM: ICD-10-CM

## 2021-02-05 RX ORDER — LEVOTHYROXINE SODIUM 13 UG/ML
SOLUTION ORAL
Qty: 30 ML | Refills: 2 | Status: SHIPPED | OUTPATIENT
Start: 2021-02-05 | End: 2021-08-31 | Stop reason: SDUPTHER

## 2021-08-31 ENCOUNTER — TELEPHONE (OUTPATIENT)
Dept: FAMILY MEDICINE CLINIC | Age: 26
End: 2021-08-31

## 2021-08-31 DIAGNOSIS — E03.9 ACQUIRED HYPOTHYROIDISM: ICD-10-CM

## 2021-08-31 RX ORDER — LEVOTHYROXINE SODIUM 13 UG/ML
1 SOLUTION ORAL DAILY
Qty: 30 ML | Refills: 3 | Status: SHIPPED | OUTPATIENT
Start: 2021-08-31

## 2021-08-31 NOTE — TELEPHONE ENCOUNTER
Talked to the patient  Mom and she said patient have different primary doctor in [place].she said she will call the pharmacy and let them know.

## 2021-12-13 DIAGNOSIS — E03.9 ACQUIRED HYPOTHYROIDISM: ICD-10-CM

## 2021-12-13 RX ORDER — LEVOTHYROXINE SODIUM 13 UG/ML
1 SOLUTION ORAL DAILY
Qty: 30 ML | Refills: 3 | OUTPATIENT
Start: 2021-12-13

## 2022-06-24 NOTE — PROGRESS NOTES
request that the above-named physician, his/her assistants or his/her designees, perform procedures as necessary and desirable if deemed to be in my (our) best interest.     Revised 8/2/2021                                                                          Page 1 of 2         3. I acknowledge that health care personnel may be observing this procedure for the purpose of medical education or other specified purposes as may be necessary as requested and/or approved by my (our) physician. 4. I (we) consent to the disposal by the hospital Pathologist of the removed tissue, parts or organs in accordance with hospital policy. 5. I do ____ do not ____ consent to the use of a local infiltration pain blocking agent that will be used by my provider/surgical provider to help alleviate pain during my procedure. 6. I do ____ do not ____ consent to an emergent blood transfusion in the case of a life-threatening situation that requires blood components to be administered. This consent is valid for 24 hours from the beginning of the procedure. 7. This patient does ____ or does not ____ currently have a DNR status/order. If DNR order is in place, obtain Addendum to the Surgical Consent for ALL Patients with a DNR Order to address mariano-operative status for limited intervention or DNR suspension.      8. I have read and fully understand the above Consent for Operation/Procedure and that all blanks were completed before I signed the consent.   _____________________________       _____________________      ____/____am/pm  Signature of Patient or legal representative      Printed Name / Relationship            Date / Time   ____________________________       _____________________      ____/____am/pm  Witness to Signature                                    Printed Name                    Date / Time     If patient is unable to sign or is a minor, complete the following)  Patient is a minor, ____ years of age, or

## 2022-06-27 NOTE — PROGRESS NOTES
[facility] PRE-SURGICAL TESTING INSTRUCTIONS                                  PRIOR TO PROCEDURE DATE:        1. PLEASE FOLLOW ANY  GUIDELINES/ INSTRUCTIONS PRIOR TO YOUR PROCEDURE AS ADVISED BY YOUR SURGEON. 2. Arrange for someone to drive you home and be with you for the first 24 hours after discharge for your safety after your procedure for which you received sedation. Ensure it is someone we can share information with regarding your discharge. 3. You must contact your surgeon for instructions IF:   You are taking any blood thinners, aspirin, anti-inflammatory or vitamin E.   There is a change in your physical condition such as a cold, fever, rash, cuts, sores or any other infection, especially near your surgical site. 4. Do not drink alcohol the day before or day of your procedure. 5. A Pre-op History and Physical for surgery MUST be completed by your Physician or Urgent Care within 30 days of your procedure date. Please bring a copy with you on the day of your procedure and along with any other testing performed. THE DAY OF YOUR PROCEDURE:  1. Follow instructions for ARRIVAL TIME as DIRECTED BY YOUR SURGEON. 2. Enter the MAIN entrance from [address] and follow the signs to the free [address] or [facility] [employer] parking (offered free of charge 6am-5pm). 3. Enter the Main Entrance of the hospital (do not enter from the lower level of the parking garage). Upon entrance, check in with the  at the main desk on your left. If no one is available at the desk, proceed into the [facility] Waiting Room and go through the door directly into the [facility]. There is a Check-in desk ACROSS from Room 5 (marked with a sign hanging from the ceiling). The phone number for the surgery center is [phone]. 4. Please call [phone] option #2 option #2 if you have not been preregistered yet.   On the day of your procedure bring your insurance card and photo ID. You will be registered at your bedside once brought back to your room. 5. DO NOT EAT ANYTHING eight hours prior to your arrival for surgery. May have 8 ounces of water 4 hours prior to your arrival for surgery. NOTE: ALL Gastric, Bariatric and Bowel surgery patients MUST follow their surgeon's instructions. 6. MEDICATIONS    Take the following medications with a SMALL sip of water: thyroid med   Bariatric patient's call surgeon if on diabetic medications as some need to be stopped 1 week preop   Use your usual dose of inhalers the morning of surgery. BRING your rescue inhaler with you to hospital.    Anesthesia does NOT want you to take insulin the morning of surgery. They will control your blood sugar while you are at the hospital. Please contact your ordering physician for instructions regarding your insulin the night before your procedure. If you have an insulin pump, please keep it set on basal rate. 7. Do not swallow water when brushing teeth. No gum, candy, mints or ice chips. Refrain from smoking or at least decrease the amount. 8. Dress in loose, comfortable clothing appropriate for redressing after your procedure. Do not wear jewelry (including body piercings), make-up (especially NO eye make-up), fingernail polish (NO toenail polish if foot/leg surgery), lotion, powders or metal hairclips. 9. Dentures, glasses, or contacts will need to be removed before your procedure. Bring cases for your glasses, contacts, dentures, or hearing aids to protect them while you are in surgery. 10. If you use a CPAP, please bring it with you on the day of your procedure. 11. We recommend that valuable personal  belongings such as cash, cell phones, e-tablets or jewelry, be left at home during your stay. The hospital will not be responsible for valuables that are not secured in the hospital safe.  However, if your insurance requires a co-pay, you may want to bring a method of payment, help you recover from any potential side effects of anesthesia, such as extreme drowsiness, changes in your vital signs or breathing patterns. Nausea, headache, muscle aches, or sore throat may also occur after anesthesia. Your nurse will help you manage these potential side effects. 2. For comfort and safety, arrange to have someone at home with you for the first 24 hours after discharge. 3. You and your family will be given written instructions about your diet, activity, dressing care, medications, and return visits. 4. Once at home, should issues with nausea, pain, or bleeding occur, or should you notice any signs of infection, you should call your surgeon. 5. Always clean your hands before and after caring for your wound. Do not let your family touch your surgery site without cleaning their hands. 6. Narcotic pain medications can cause significant constipation. You may want to add a stool softener to your postoperative medication schedule or speak to your surgeon on how best to manage this SIDE EFFECT. SPECIAL INSTRUCTIONS     Thank you for allowing us to care for you. We strive to exceed your expectations in the delivery of care and service provided to you and your family. If you need to contact the [place] [address] staff for any reason, please call us at [phone]    Instructions reviewed with patient during preadmission testing phone interview.   Carissa Del Cid RN.6/27/2022 .2:38 PM      ADDITIONAL EDUCATIONAL INFORMATION REVIEWED PER PHONE WITH YOU AND/OR YOUR FAMILY:  No Hibiclens® Bathing Instructions   Yes Antibacterial Soap

## 2022-06-27 NOTE — PROGRESS NOTES
Mother states she is legal guardian as well as father.  She will bring documents DOS,    6/28 spoke with mom will bring copy of H&P-mp

## 2022-06-28 ENCOUNTER — ANESTHESIA EVENT (OUTPATIENT)
Dept: OPERATING ROOM | Age: 27
End: 2022-06-28
Payer: COMMERCIAL

## 2022-06-29 ENCOUNTER — ANESTHESIA (OUTPATIENT)
Dept: OPERATING ROOM | Age: 27
End: 2022-06-29
Payer: COMMERCIAL

## 2022-06-29 ENCOUNTER — HOSPITAL ENCOUNTER (OUTPATIENT)
Age: 27
Setting detail: OUTPATIENT SURGERY
Discharge: HOSPICE | End: 2022-06-29
Attending: PODIATRIST | Admitting: PODIATRIST
Payer: COMMERCIAL

## 2022-06-29 VITALS
SYSTOLIC BLOOD PRESSURE: 121 MMHG | TEMPERATURE: 97.8 F | WEIGHT: 116 LBS | OXYGEN SATURATION: 99 % | RESPIRATION RATE: 13 BRPM | HEART RATE: 86 BPM | BODY MASS INDEX: 20.55 KG/M2 | DIASTOLIC BLOOD PRESSURE: 76 MMHG | HEIGHT: 63 IN

## 2022-06-29 PROCEDURE — 3600000004 HC SURGERY LEVEL 4 BASE: Performed by: PODIATRIST

## 2022-06-29 PROCEDURE — 7100000001 HC PACU RECOVERY - ADDTL 15 MIN: Performed by: PODIATRIST

## 2022-06-29 PROCEDURE — 6370000000 HC RX 637 (ALT 250 FOR IP): Performed by: ANESTHESIOLOGY

## 2022-06-29 PROCEDURE — 6360000002 HC RX W HCPCS

## 2022-06-29 PROCEDURE — 7100000010 HC PHASE II RECOVERY - FIRST 15 MIN: Performed by: PODIATRIST

## 2022-06-29 PROCEDURE — 2580000003 HC RX 258: Performed by: ANESTHESIOLOGY

## 2022-06-29 PROCEDURE — 7100000000 HC PACU RECOVERY - FIRST 15 MIN: Performed by: PODIATRIST

## 2022-06-29 PROCEDURE — 2709999900 HC NON-CHARGEABLE SUPPLY: Performed by: PODIATRIST

## 2022-06-29 PROCEDURE — 3700000000 HC ANESTHESIA ATTENDED CARE: Performed by: PODIATRIST

## 2022-06-29 PROCEDURE — 3600000014 HC SURGERY LEVEL 4 ADDTL 15MIN: Performed by: PODIATRIST

## 2022-06-29 PROCEDURE — 3700000001 HC ADD 15 MINUTES (ANESTHESIA): Performed by: PODIATRIST

## 2022-06-29 PROCEDURE — 2500000003 HC RX 250 WO HCPCS

## 2022-06-29 RX ORDER — SODIUM CHLORIDE, SODIUM LACTATE, POTASSIUM CHLORIDE, CALCIUM CHLORIDE 600; 310; 30; 20 MG/100ML; MG/100ML; MG/100ML; MG/100ML
INJECTION, SOLUTION INTRAVENOUS CONTINUOUS
Status: DISCONTINUED | OUTPATIENT
Start: 2022-06-29 | End: 2022-06-29 | Stop reason: HOSPADM

## 2022-06-29 RX ORDER — PROCHLORPERAZINE EDISYLATE 5 MG/ML
5 INJECTION INTRAMUSCULAR; INTRAVENOUS
Status: DISCONTINUED | OUTPATIENT
Start: 2022-06-29 | End: 2022-06-29 | Stop reason: HOSPADM

## 2022-06-29 RX ORDER — MIDAZOLAM HYDROCHLORIDE 2 MG/ML
2 SYRUP ORAL ONCE
Status: COMPLETED | OUTPATIENT
Start: 2022-06-29 | End: 2022-06-29

## 2022-06-29 RX ORDER — FENTANYL CITRATE 50 UG/ML
INJECTION, SOLUTION INTRAMUSCULAR; INTRAVENOUS PRN
Status: DISCONTINUED | OUTPATIENT
Start: 2022-06-29 | End: 2022-06-29 | Stop reason: SDUPTHER

## 2022-06-29 RX ORDER — HYDRALAZINE HYDROCHLORIDE 20 MG/ML
10 INJECTION INTRAMUSCULAR; INTRAVENOUS
Status: DISCONTINUED | OUTPATIENT
Start: 2022-06-29 | End: 2022-06-29 | Stop reason: HOSPADM

## 2022-06-29 RX ORDER — PROPOFOL 10 MG/ML
INJECTION, EMULSION INTRAVENOUS CONTINUOUS PRN
Status: DISCONTINUED | OUTPATIENT
Start: 2022-06-29 | End: 2022-06-29 | Stop reason: SDUPTHER

## 2022-06-29 RX ORDER — SODIUM CHLORIDE 0.9 % (FLUSH) 0.9 %
5-40 SYRINGE (ML) INJECTION PRN
Status: DISCONTINUED | OUTPATIENT
Start: 2022-06-29 | End: 2022-06-29 | Stop reason: HOSPADM

## 2022-06-29 RX ORDER — SODIUM CHLORIDE 0.9 % (FLUSH) 0.9 %
5-40 SYRINGE (ML) INJECTION EVERY 12 HOURS SCHEDULED
Status: DISCONTINUED | OUTPATIENT
Start: 2022-06-29 | End: 2022-06-29 | Stop reason: HOSPADM

## 2022-06-29 RX ORDER — MEPERIDINE HYDROCHLORIDE 25 MG/ML
12.5 INJECTION INTRAMUSCULAR; INTRAVENOUS; SUBCUTANEOUS EVERY 5 MIN PRN
Status: DISCONTINUED | OUTPATIENT
Start: 2022-06-29 | End: 2022-06-29 | Stop reason: HOSPADM

## 2022-06-29 RX ORDER — PROPOFOL 10 MG/ML
INJECTION, EMULSION INTRAVENOUS PRN
Status: DISCONTINUED | OUTPATIENT
Start: 2022-06-29 | End: 2022-06-29 | Stop reason: SDUPTHER

## 2022-06-29 RX ORDER — FAMOTIDINE 10 MG/ML
INJECTION, SOLUTION INTRAVENOUS PRN
Status: DISCONTINUED | OUTPATIENT
Start: 2022-06-29 | End: 2022-06-29 | Stop reason: SDUPTHER

## 2022-06-29 RX ORDER — SODIUM CHLORIDE 9 MG/ML
25 INJECTION, SOLUTION INTRAVENOUS PRN
Status: DISCONTINUED | OUTPATIENT
Start: 2022-06-29 | End: 2022-06-29 | Stop reason: HOSPADM

## 2022-06-29 RX ORDER — ONDANSETRON 2 MG/ML
INJECTION INTRAMUSCULAR; INTRAVENOUS PRN
Status: DISCONTINUED | OUTPATIENT
Start: 2022-06-29 | End: 2022-06-29 | Stop reason: SDUPTHER

## 2022-06-29 RX ORDER — CLINDAMYCIN PHOSPHATE 600 MG/50ML
600 INJECTION INTRAVENOUS ONCE
Status: DISCONTINUED | OUTPATIENT
Start: 2022-06-29 | End: 2022-06-29

## 2022-06-29 RX ADMIN — ONDANSETRON 4 MG: 2 INJECTION INTRAMUSCULAR; INTRAVENOUS at 07:34

## 2022-06-29 RX ADMIN — MIDAZOLAM HYDROCHLORIDE 10 MG: 2 SYRUP ORAL at 07:00

## 2022-06-29 RX ADMIN — FENTANYL CITRATE 25 MCG: 50 INJECTION, SOLUTION INTRAMUSCULAR; INTRAVENOUS at 07:31

## 2022-06-29 RX ADMIN — FAMOTIDINE 20 MG: 10 INJECTION, SOLUTION INTRAVENOUS at 07:34

## 2022-06-29 RX ADMIN — PROPOFOL 50 MG: 10 INJECTION, EMULSION INTRAVENOUS at 07:31

## 2022-06-29 RX ADMIN — SODIUM CHLORIDE, POTASSIUM CHLORIDE, SODIUM LACTATE AND CALCIUM CHLORIDE: 600; 310; 30; 20 INJECTION, SOLUTION INTRAVENOUS at 07:21

## 2022-06-29 RX ADMIN — PROPOFOL 100 MCG/KG/MIN: 10 INJECTION, EMULSION INTRAVENOUS at 07:31

## 2022-06-29 RX ADMIN — Medication 100 MG: at 07:31

## 2022-06-29 ASSESSMENT — PAIN SCALES - WONG BAKER: WONGBAKER_NUMERICALRESPONSE: 0

## 2022-06-29 NOTE — H&P
[address]    [phone]    [facility] Same Day Surgery Update H & P  Department of General Surgery   Surgical Service   Pre-operative History and Physical  Last H & P within the last 30 days. DIAGNOSIS:   Right foot ulcer, limited to breakdown of skin ([address] [L97.511]    Procedure(s):  DEBRIDE ULCER RIGHT 5TH TOE    History obtained from: Family interview and EHR      HISTORY OF PRESENT ILLNESS:   The patient is a 24 y.o. male with right 5th toe ulcer presents today for debridement. Illness Screening: Patient denies fever, chills, worsening cough, or close contact with sick individuals. Past Medical History:        Diagnosis Date    ASD (atrial septal defect)     Autism     Autism     Celiac disease     Down syndrome     Down syndrome     Heart murmur     MVP (mitral valve prolapse)     Thyroid disease      Past Surgical History:        Procedure Laterality Date    CARDIAC SURGERY      as a baby  Mitral valve and asd repair     CARDIAC SURGERY      ESOPHAGEAL DILATATION      TYMPANOSTOMY TUBE PLACEMENT         Medications Prior to Admission:      Prior to Admission medications    Medication Sig Start Date End Date Taking?  Authorizing Provider   Multiple Vitamins-Minerals (ADULT ONE DAILY GUMMIES PO) Take 1 tablet by mouth daily   Yes Historical Provider, MD   Levothyroxine Sodium (TIROSINT-SOL) 13 MCG/ML SOLN Take 1 mL by mouth daily 8/31/21   Deloris Spear MD   cetirizine (ZYRTEC) 10 MG chewable tablet     Historical Provider, MD   OMEPRAZOLE PO Take 20 mg by mouth daily 20ml daily ORAL SUSPENSION     Historical Provider, MD         Allergies:  Latex, Cefdinir, Gluten meal, and Gluten    PHYSICAL EXAM:      BP (!) 156/99 Comment: Anesthesia notified  Pulse 98   Temp 98.4 °F (36.9 °C) (Temporal)   Resp 16   Ht 5' 3\" (1.6 m)   Wt 116 lb (52.6 kg)   SpO2 100%   BMI 20.55 kg/m²      Airway:  Airway patent with no audible stridor    Heart:  Regular rate and rhythm, Lungs: No increased work of breathing, good air exchange, clear to auscultation bilaterally, no crackles or wheezing    Abdomen:  Soft, non-distended, non-tender, no rebound tenderness or guarding, and no masses palpated    ASSESSMENT AND PLAN     Patient is a 24 y.o. male with above specified procedure planned. 1.  Patient seen and focused exam done today- no new changes since last physical exam on 5/31/22    2. Access to ancillary services are available per request of the provider.     EFRAIN Caban - CNP     6/29/2022

## 2022-06-29 NOTE — BRIEF OP NOTE
Brief Postoperative Note      Patient: Sapphire Jimenez  YOB: 1995  MRN: [id number]    Date of Procedure: 6/29/2022    Pre-Op Diagnosis: Right foot ulcer, limited to breakdown of skin ([facility] [L97.511]    Post-Op Diagnosis: Same       Procedure(s):  DEBRIDE ULCER RIGHT 5TH TOE    Surgeon(s):  Niel Miranda DPM    Assistant:  Sherri Sawant DPM, PGY1    Hemostasis: Surgical Technique     Injectables: None    Materials: None    Anesthesia: Choice    Estimated Blood Loss (mL): Minimal    Complications: None    Specimens:   * No specimens in log *    Implants:  * No implants in log *      Drains: * No LDAs found *    Findings: See Op Note    Electronically signed by Sherri Sawant DPM on 6/29/2022 at 7:41 AM

## 2022-06-29 NOTE — PROGRESS NOTES
9706 Dr. Berenice Lantigua here to see pt and Mom requesting that pt 'not be awake for IV d/t pt being combative' and aware of pt with elevated BP with no new orders and 'OK to hold IV and will start in OR'

## 2022-06-29 NOTE — PROGRESS NOTES
Discharge instructions discussed with mother who verbalized an understanding. Patient dressed per mother and IV removed.

## 2022-06-29 NOTE — ANESTHESIA POSTPROCEDURE EVALUATION
Department of Anesthesiology  Postprocedure Note    Patient: Gabriela Mccabe  MRN: [id number]  YOB: 1995  Date of evaluation: 6/29/2022      Procedure Summary     Date: 06/29/22 Room / Location: [address] [place] [address] [address] [facility] 03 / [facility]    Anesthesia Start: 9035 Anesthesia Stop: 2518    Procedure: DEBRIDE ULCER RIGHT 5TH TOE (Right ) Diagnosis:       Right foot ulcer, limited to breakdown of skin ([address]      (Right foot ulcer, limited to breakdown of skin ([address] [R10.540])    Surgeons: Janiya Singh DPM Responsible Provider: Gutierrez Gross,     Anesthesia Type: MAC ASA Status: 3          Anesthesia Type: MAC    Marga Phase I: Marga Score: 10    Marga Phase II: Marga Score: 10      Anesthesia Post Evaluation    Patient location during evaluation: PACU  Patient participation: complete - patient participated  Level of consciousness: awake  Pain score: 0  Airway patency: patent  Nausea & Vomiting: no nausea and no vomiting  Complications: no  Cardiovascular status: blood pressure returned to baseline  Respiratory status: acceptable  Hydration status: euvolemic  Multimodal analgesia pain management approach

## 2022-06-29 NOTE — OP NOTE
Operative Note      Patient: Josiah Tracy  YOB: 1995  MRN: [id number]    Date of Procedure: 6/29/2022    Pre-Op Diagnosis: Right foot ulcer, limited to breakdown of skin ([facility] [L97.511]    Post-Op Diagnosis: Same       Procedure(s):  DEBRIDE ULCER RIGHT 5TH TOE    Surgeon(s):  Avinash Russell DPM    Assistant:  Stevan Buitrago DPM, PGY1     Hemostasis: Surgical Technique      Injectables: None     Materials: None    Anesthesia: Choice    Estimated Blood Loss (mL): Minimal    Complications: None    Specimens:   * No specimens in log *    Implants:  * No implants in log *      Drains: * No LDAs found *    Findings: Stage 1 Ulcerative Hyperkeratosis noted to right 5th digit distally. Detailed Description of Procedure:     INDICATIONS FOR PROCEDURE: This patient has signs and symptoms clinically and radiographically consistent with the above mentioned preoperative diagnosis. Having failed conservative treatment, it was determined that the patient would benefit from surgical intervention. All potential risks, benefits, and complications were discussed with the patient prior to the scheduling of surgery. All the patient's questions were answered and no guarantees were given. The patient wished to proceed with surgery, and informed written consent was obtained. DETAILS OF PROCEDURE: The patient was brought from the pre-operative area and placed on the operating table in the supine position. Following IV sedation, a time-out was performed. The patient, procedure, and operative site were confirmed and the following procedure was performed. PROCEDURE #1: Excisional Debridement of Ulcerative Hyperkeratosis, left fifth digit    Attention was directed to the ulcerative hyperkeratosis noted to the distal lateral aspect of the fifth digit on the left foot. Using a sterile #15 blade the hyperkeratosis was debrided down to and including dermal tissue. No bleeding was noted throughout procedure.     END OF PROCEDURE: The patient tolerated the procedure and anesthesia well and was transported from the operating room to the PACU with vital signs stable and vascular status intact to all aspects of the patient's left lower extremity and digital capillary refill time immediate to the digits of the left foot. Following a period of post-operative monitoring, the patient will be discharged home with written and oral wound care and follow-up instructions. The patient is to follow-up with Dr. Roshni Hobson in his private office within 5-7 days.      This operative report was dictated on behalf of Dr. Alex Watt DPM.    Kayli Aggarwal DPM  Podiatric Resident, PGY-1  Pager #: [phone] or Perfect Serve    Electronically signed by Kayli Aggarwal DPM on 6/29/2022 at 7:42 AM

## 2022-06-29 NOTE — PROGRESS NOTES
Pt arrived form OR, s/p DEBRIDE ULCER RIGHT 5TH TOE - Right, report received form CRNA, pt sedated on arrival, oral airway in place

## 2022-06-29 NOTE — PROGRESS NOTES
PACU Discharge Note    Current Allergies: Latex, Cefdinir, Gluten meal, and Gluten    Pt meets criteria for discharge to home per Richard Score and ASPAN standards. Discussed with patient and responsible individual receiving instructions how to measure pain per numerical scale and when to contact doctor if prescribed medications are not helping with post operative pain    Discharge instructions reviewed with patient and family. Both verbalized understanding of instructions. Gave patient and family opportunity to ask questions. All questions reviewed and answered. Documents signed and copy of discharge instructions given. Vitals:    06/29/22 0945   BP: 121/76   Pulse: 86   Resp: 13   Temp: 97.8 °F (36.6 °C)   SpO2: 99%      BP within 20% of pt's admitting BP per RICHARD SCORE      Intake/Output Summary (Last 24 hours) at 6/29/2022 1023  Last data filed at 6/29/2022 5644  Gross per 24 hour   Intake 300 ml   Output --   Net 300 ml         Pain assessment:  none       Offered patient opportunity to use restroom prior to discharge.  Patient declined    Patient discharged to home/self care via wheel chair by transporter/RN with a responsible individual.    Mother given discharge instructions, verbalized an understanding  6/29/2022 10:23 AM

## 2022-06-29 NOTE — PROGRESS NOTES
Waking up more, eyes closed @ intervals but more wakeful with mom @ bedside. Denied pain, wiggling surgical foot. Took few ice chips, VSS on RA.

## 2022-06-29 NOTE — PROGRESS NOTES
Dr. Lori Sue gave Liquid Versed to pt while Dr. Ahsan Caldwell at bedside and while receiving instructions from Dr. Ahsan Caldwell, this nurse notified OR room 'Not to open anything extra' and [address] as ordered. Mom remains at pt bedside.

## 2022-06-29 NOTE — PROGRESS NOTES
Dr. Thea Patel started IV at bedside X2 attempts with Mom and OR room team at bedside. With Mom holding Left arm and nurse holding Right upper arm. Pt tolerates well.

## 2022-06-29 NOTE — ANESTHESIA PRE PROCEDURE
Department of Anesthesiology  Preprocedure Note       Name:  Theo Michael   Age:  24 y.o.  :  1995                                          MRN:  [id number]         Date:  2022      Surgeon: Jacklyn Barros):  Marjorie Brunner DPM    Procedure: Procedure(s):  DEBRIDE ULCER RIGHT 5TH TOE    Medications prior to admission:   Prior to Admission medications    Medication Sig Start Date End Date Taking? Authorizing Provider   Multiple Vitamins-Minerals (ADULT ONE DAILY GUMMIES PO) Take 1 tablet by mouth daily   Yes Historical Provider, MD   Levothyroxine Sodium (TIROSINT-SOL) 13 MCG/ML SOLN Take 1 mL by mouth daily 21   Marlene Wright MD   cetirizine (ZYRTEC) 10 MG chewable tablet     Historical Provider, MD   OMEPRAZOLE PO Take 20 mg by mouth daily 20ml daily ORAL SUSPENSION     Historical Provider, MD       Current medications:    Current Facility-Administered Medications   Medication Dose Route Frequency Provider Last Rate Last Admin    clindamycin (CLEOCIN) 600 mg in dextrose 5 % 50 mL IVPB  600 mg IntraVENous Once Marjorie Brunner DPM        lactated ringers infusion   IntraVENous Continuous Fan Hand, DO        midazolam (VERSED) 2 MG/ML syrup 2 mg  2 mg Oral Once Erleen Bares, DO           Allergies:     Allergies   Allergen Reactions    Latex Rash    Cefdinir Other (See Comments) and Rash     [place] on hands and feet  Blisters on hands and feet    Gluten Meal      Celiac disease    Gluten Nausea And Vomiting       Problem List:    Patient Active Problem List   Diagnosis Code    Autism spectrum disorder with accompanying intellectual impairment, requiring subtantial support (level 2) F84.0    Down's syndrome Q90.9    At risk for cardiac dysfunction during anesthesia Z91.89    Behavioral problem [id number]    Intermittent explosive disorder F63.81    Speech impairment R47.9       Past Medical History:        Diagnosis Date    ASD (atrial septal defect)     Autism     Autism     Celiac disease     Down syndrome     Down syndrome     Heart murmur     MVP (mitral valve prolapse)     Thyroid disease        Past Surgical History:        Procedure Laterality Date    CARDIAC SURGERY      as a baby  Mitral valve and asd repair     CARDIAC SURGERY      ESOPHAGEAL DILATATION      TYMPANOSTOMY TUBE PLACEMENT         Social History:    Social History     Tobacco Use    Smoking status: Never Smoker    Smokeless tobacco: Never Used   Substance Use Topics    Alcohol use: Never                                Counseling given: Not Answered      Vital Signs (Current):   Vitals:    06/27/22 1433 06/29/22 0620   BP:  (!) 156/99   Pulse:  98   Resp:  16   Temp:  98.4 °F (36.9 °C)   TempSrc:  Temporal   SpO2:  100%   Weight: 116 lb (52.6 kg) 116 lb (52.6 kg)   Height: 5' 3\" (1.6 m) 5' 3\" (1.6 m)                                              BP Readings from Last 3 Encounters:   06/29/22 (!) 156/99   03/16/20 108/68   01/14/20 110/70       NPO Status:                                                                                 BMI:   Wt Readings from Last 3 Encounters:   06/29/22 116 lb (52.6 kg)   03/16/20 114 lb (51.7 kg)   01/14/20 114 lb (51.7 kg)     Body mass index is 20.55 kg/m².     CBC:   Lab Results   Component Value Date    WBC 4.0 03/11/2020    RBC 4.80 03/11/2020    HGB 16.4 03/11/2020    HCT 48.9 03/11/2020    .9 03/11/2020    RDW 13.7 03/11/2020     03/11/2020       CMP:   Lab Results   Component Value Date     01/04/2020    K 4.9 01/04/2020    CL 99 01/04/2020    CO2 28 01/04/2020    BUN 11 01/04/2020    CREATININE 0.9 01/04/2020    GFRAA >60 01/04/2020    AGRATIO 1.7 01/04/2020    LABGLOM >60 01/04/2020    GLUCOSE 95 01/04/2020    PROT 7.5 03/11/2020    CALCIUM 9.7 01/04/2020    BILITOT 2.4 03/11/2020    ALKPHOS 110 03/11/2020    AST 21 03/11/2020    ALT 11 03/11/2020       POC Tests: No results for input(s): POCGLU, POCNA, POCK, POCCL, POCBUN, Aleshia Mary Lou in the last 72 hours. Coags: No results found for: PROTIME, INR, APTT    HCG (If Applicable): No results found for: PREGTESTUR, PREGSERUM, HCG, HCGQUANT     ABGs: No results found for: PHART, PO2ART, ONT0XKA, IFZ1BMS, BEART, Z4QQVNYC     Type & Screen (If Applicable):  No results found for: LABABO, LABRH    Drug/Infectious Status (If Applicable):  No results found for: HIV, HEPCAB    COVID-19 Screening (If Applicable): No results found for: COVID19        Anesthesia Evaluation  Patient summary reviewed and Nursing notes reviewed no history of anesthetic complications:   Airway: Mallampati: III  TM distance: >3 FB   Neck ROM: full  Mouth opening: > = 3 FB   Dental: normal exam         Pulmonary:Negative Pulmonary ROS and normal exam                               Cardiovascular:  Exercise tolerance: good (>4 METS),   (+) valvular problems/murmurs (ASD repaired):,     (-)  angina, orthopnea and PND    ECG reviewed  Rhythm: regular  Rate: normal           Beta Blocker:  Not on Beta Blocker         Neuro/Psych:   (+) psychiatric history: stable with treatment            GI/Hepatic/Renal: Neg GI/Hepatic/Renal ROS            Endo/Other: Negative Endo/Other ROS                    Abdominal:         (-) obese Abdomen: soft. Vascular: negative vascular ROS. Other Findings:           Anesthesia Plan      MAC     ASA 3       Induction: intravenous. MIPS: Postoperative opioids intended and Prophylactic antiemetics administered. Anesthetic plan and risks discussed with patient. Plan discussed with CRNA and attending.                     Larissa Echols DO   6/29/2022

## (undated) DEVICE — TOWEL,STOP FLAG GOLD N-W: Brand: MEDLINE

## (undated) DEVICE — HOLDER SCALP PLAS G STD

## (undated) DEVICE — PREMIUM WET SKIN PREP TRAY: Brand: MEDLINE INDUSTRIES, INC.

## (undated) DEVICE — STANDARD HYPODERMIC NEEDLE,POLYPROPYLENE HUB: Brand: MONOJECT

## (undated) DEVICE — HIGH FLOW TIP

## (undated) DEVICE — TOWEL,OR,DSP,ST,BLUE,DLX,8/PK,10PK/CS: Brand: MEDLINE

## (undated) DEVICE — GAUZE,SPONGE,4"X4",16PLY,XRAY,STRL,LF: Brand: MEDLINE

## (undated) DEVICE — GLOVE ORANGE PI 8   MSG9080

## (undated) DEVICE — SYRINGE MED 10ML LUERLOCK TIP W/O SFTY DISP

## (undated) DEVICE — BLANKET WRM W29.9XL79.1IN UP BODY FORC AIR MISTRAL-AIR

## (undated) DEVICE — ZIMMER® STERILE DISPOSABLE TOURNIQUET CUFF WITH PLC, DUAL PORT, SINGLE BLADDER, 18 IN. (46 CM)

## (undated) DEVICE — COVER LT HNDL BLU PLAS

## (undated) DEVICE — HANDPIECE SET WITH SUCTION TUBING: Brand: INTERPULSE

## (undated) DEVICE — GLOVE ORANGE PI 7 1/2   MSG9075

## (undated) DEVICE — PODIATRY: Brand: MEDLINE INDUSTRIES, INC.

## (undated) DEVICE — SOLUTION IV 1000ML 0.9% SOD CHL